# Patient Record
Sex: MALE
[De-identification: names, ages, dates, MRNs, and addresses within clinical notes are randomized per-mention and may not be internally consistent; named-entity substitution may affect disease eponyms.]

---

## 2019-11-22 ENCOUNTER — TRANSCRIPTION ENCOUNTER (OUTPATIENT)
Age: 44
End: 2019-11-22

## 2020-01-29 ENCOUNTER — TRANSCRIPTION ENCOUNTER (OUTPATIENT)
Age: 45
End: 2020-01-29

## 2020-02-03 PROBLEM — Z00.00 ENCOUNTER FOR PREVENTIVE HEALTH EXAMINATION: Status: ACTIVE | Noted: 2020-02-03

## 2020-02-05 ENCOUNTER — APPOINTMENT (OUTPATIENT)
Dept: OTOLARYNGOLOGY | Facility: CLINIC | Age: 45
End: 2020-02-05
Payer: COMMERCIAL

## 2020-02-05 VITALS
WEIGHT: 200 LBS | DIASTOLIC BLOOD PRESSURE: 92 MMHG | BODY MASS INDEX: 30.31 KG/M2 | TEMPERATURE: 98.2 F | HEIGHT: 68 IN | OXYGEN SATURATION: 96 % | SYSTOLIC BLOOD PRESSURE: 131 MMHG | HEART RATE: 80 BPM

## 2020-02-05 DIAGNOSIS — H61.21 IMPACTED CERUMEN, RIGHT EAR: ICD-10-CM

## 2020-02-05 DIAGNOSIS — R42 DIZZINESS AND GIDDINESS: ICD-10-CM

## 2020-02-05 DIAGNOSIS — F17.200 NICOTINE DEPENDENCE, UNSPECIFIED, UNCOMPLICATED: ICD-10-CM

## 2020-02-05 DIAGNOSIS — Z78.9 OTHER SPECIFIED HEALTH STATUS: ICD-10-CM

## 2020-02-05 PROCEDURE — 99204 OFFICE O/P NEW MOD 45 MIN: CPT | Mod: 25

## 2020-02-05 PROCEDURE — 69210 REMOVE IMPACTED EAR WAX UNI: CPT

## 2020-02-05 RX ORDER — AMOXICILLIN 500 MG/1
500 CAPSULE ORAL
Refills: 0 | Status: ACTIVE | COMMUNITY

## 2020-02-05 RX ORDER — MECLIZINE HYDROCHLORIDE 25 MG/1
25 TABLET ORAL
Refills: 0 | Status: ACTIVE | COMMUNITY

## 2020-02-05 RX ORDER — IBUPROFEN 50 MG/1.25ML
SUSPENSION/ DROPS ORAL
Refills: 0 | Status: ACTIVE | COMMUNITY

## 2020-02-05 NOTE — ASSESSMENT
[FreeTextEntry1] : Explained that other than some wax accumulation he has no indication of ear problems or infections and does not require the medications that were given to him. He may discuss further dizziness w/ his PCP, but we discussed orthostasis prevention & self monitoring for palpitations.

## 2020-02-05 NOTE — HISTORY OF PRESENT ILLNESS
[de-identified] : In 11/19 he developed several days of significant lightheadedness but no vertigo; this began after standing up quickly from a squatting position. An urgent care told him that he had bilat cerumen impaction. Ever since then he's had "light dizziness" that comes & goes; walking and Motrin help. He was seen at another urgent care 5d ago & was given amox & meclizine for "fluid in the ear".  He denies hearing loss or tinnitus at any point. Not sure if he's had some palpitations.

## 2020-02-05 NOTE — PHYSICAL EXAM
[Binocular Microscopic Exam] : Binocular microscopic exam was performed [FreeTextEntry8] : non-occlusive, wet cerumen impaction removed with suction [de-identified] : negative fistula test; EOMI/PERRL w/ good smooth pursuit [] : septum deviated to the right [Normal] : no rashes